# Patient Record
Sex: FEMALE | Race: WHITE | Employment: OTHER | URBAN - METROPOLITAN AREA
[De-identification: names, ages, dates, MRNs, and addresses within clinical notes are randomized per-mention and may not be internally consistent; named-entity substitution may affect disease eponyms.]

---

## 2017-04-05 PROBLEM — C43.59 MALIGNANT MELANOMA OF TORSO EXCLUDING BREAST (HCC): Status: ACTIVE | Noted: 2017-04-05

## 2017-04-27 ENCOUNTER — HOSPITAL ENCOUNTER (OUTPATIENT)
Dept: SURGERY | Age: 47
Discharge: HOME | End: 2017-04-27
Attending: OBSTETRICS & GYNECOLOGY
Payer: COMMERCIAL

## 2017-04-27 VITALS
DIASTOLIC BLOOD PRESSURE: 93 MMHG | TEMPERATURE: 95.5 F | RESPIRATION RATE: 16 BRPM | HEART RATE: 75 BPM | OXYGEN SATURATION: 100 % | BODY MASS INDEX: 30.52 KG/M2 | HEIGHT: 71 IN | WEIGHT: 218 LBS | SYSTOLIC BLOOD PRESSURE: 142 MMHG

## 2017-04-27 PROBLEM — Z01.818 PREOP EXAMINATION: Status: ACTIVE | Noted: 2017-04-27

## 2017-04-27 LAB
CREAT SERPL-MCNC: 0.82 MG/DL (ref 0.6–1)
HGB BLD-MCNC: 13 G/DL (ref 11.7–15.4)
POTASSIUM SERPL-SCNC: 3.5 MMOL/L (ref 3.5–5.1)

## 2017-04-27 PROCEDURE — 85018 HEMOGLOBIN: CPT | Performed by: ANESTHESIOLOGY

## 2017-04-27 PROCEDURE — 82565 ASSAY OF CREATININE: CPT | Performed by: ANESTHESIOLOGY

## 2017-04-27 PROCEDURE — 84132 ASSAY OF SERUM POTASSIUM: CPT | Performed by: ANESTHESIOLOGY

## 2017-04-27 NOTE — PERIOP NOTES
Patient verified name, , and surgery as listed in [facility]. Type 2 surgery, walk in assessment complete. Labs per surgeon: none;   Labs per anesthesia protocol: hemoglobin:  13.0; potassium:  3.5, creatinine:  0.82; results within anesthesia guidelines; printed/placed on chart; routed to Dr. Ezequiel Cantrell, PCP and to surgeon, Dr. Lisa Cramer for further review. EKG: none needed per protocol    Hibiclens and instructions given per hospital policy. Patient provided with handouts including Guide to Surgery, Pain Management, Hand Hygiene, Blood Transfusion Education, and [facility] Brochure. Patient answered medical/surgical history questions at their best of ability. All prior to admission medications documented in [facility]. Original medication prescription bottle NOT visualized during patient appointment. Patient instructed to hold all vitamins 7 days prior to surgery and NSAIDS 5 days prior to surgery, patient verbalized understanding. Medications to be held Mobic hold for 5 days prior to surgery    Patient instructed to continue previous medications as prescribed prior to surgery and to take the following medications the day of surgery according to anesthesia guidelines with a small sip of water: Flexeril, Lexapro, Ativan and Norco, if needed. Patient taught back and verbalized understanding.

## 2017-04-27 NOTE — PERIOP NOTES
Recent Results (from the past 8 hour(s))   HEMOGLOBIN    Collection Time: 04/27/17  2:25 PM   Result Value Ref Range    HGB 13.0 11.7 - 15.4 g/dL   POTASSIUM    Collection Time: 04/27/17  2:25 PM   Result Value Ref Range    Potassium 3.5 3.5 - 5.1 mmol/L   CREATININE    Collection Time: 04/27/17  2:25 PM   Result Value Ref Range    Creatinine 0.82 0.6 - 1.0 MG/DL

## 2017-04-27 NOTE — H&P
History and Physical      Darcella End:   Physician:  Queta Estrada. Alt, DO    This 50 y.o. female presents today for pre-operative evaluation. History: This 50 y.o.   patient has history of  menorrhagia. She had us that showed 6 week uterus with 2 fibroids. She is in the process of having several moles removed / diagnosis of melanoma. She does suffer from chronic back pain and has been taking norco.  She also has been having panic attacks for which she takes ativan. OB History     No data available          Past Medical History:   Diagnosis Date    Arthritis     Cervicalgia     Chronic pain     Claustrophobia     Depression     Hypertension     Lumbago     Migraine headache         has a past surgical history that includes orthopaedic ();  section; back surgery (2005); and carpal tunnel release (Left).  x 2   Current Outpatient Prescriptions   Medication Sig Dispense    escitalopram oxalate (LEXAPRO) 20 mg tablet Take 1 Tab by mouth daily. 90 Tab    lisinopril-hydroCHLOROthiazide (PRINZIDE, ZESTORETIC) 10-12.5 mg per tablet Take 1 Tab by mouth two (2) times a day. Indications: hypertension 180 Tab    LORazepam (ATIVAN) 1 mg tablet Take 1-2 Tabs by mouth every six (6) hours as needed for Anxiety. Max Daily Amount: 6 mg. Indications: ANXIETY 150 Tab    B INFANTIS/B ANI/B CARLA/B BIFID (PROBIOTIC 4X PO) Take  by mouth.  temazepam (RESTORIL) 15 mg capsule TAKE ONE CAPSULE BY MOUTH AT BEDTIME 30 Cap    meloxicam (MOBIC) 7.5 mg tablet Take  by mouth two (2) times a day.  amitriptyline (ELAVIL) 25 mg tablet Take  by mouth nightly.  promethazine (PHENERGAN) 25 mg suppository Insert 1 Suppository into rectum every six (6) hours as needed for Nausea. 6 Suppository    VITAMIN B COMP AND VIT C NO.6 (VITAMIN B COMP WITH VIT C NO.6 PO) Take 1 Tab by mouth daily.      HYDROcodone-acetaminophen (NORCO)  mg tablet Take 1 Tab by mouth every eight (8) hours as needed. Indications: PAIN     cyclobenzaprine (FLEXERIL) 10 mg tablet Take 10 mg by mouth three (3) times daily as needed for Muscle Spasm(s).  ascorbic acid (VITAMIN C) 1,000 mg tablet Take 1,000 mg by mouth daily.  cyanocobalamin (VITAMIN B-12) 2,500 mcg sublingual tablet Take 2,500 mcg by mouth daily.  hydrOXYzine HCl (ATARAX) 25 mg tablet Take 1 Tab by mouth three (3) times daily as needed for Itching. 270 Tab     No current facility-administered medications for this visit. Allergies   Allergen Reactions    Prednisone Nausea and Vomiting    Adhesive Rash    Amoxil [Amoxicillin] Unknown (comments)    Bactrim [Sulfamethoxazole-Trimethoprim] Hives    Penicillins Unknown (comments)   . reports that she quit smoking about 4 years ago. She has never used smokeless tobacco. She reports that she drinks alcohol. She reports that she does not use illicit drugs. family history includes Alcohol abuse in her brother; Breast Cancer (age of onset: 54) in her maternal aunt; Depression in her father; Heart Attack in her brother; Hypertension in her father; No Known Problems in her mother; Osteoporosis in an other family member; Stroke in her father. Physical Exam:    Vitals:    04/27/17 1103   BP: 120/84   Weight: 219 lb (99.3 kg)       Patient without distress. Heart: Regular rate and rhythm   Lung: clear to auscultation throughout lung fields, no wheezes, no rales, no rhonchi and normal respiratory effort  Abdomen: soft, nontender  Lower Extremities:  - Edema No    Findings/Diagnosis:   Pre-Op Evaluation done today. Menorrhagia / enlarged uterus. Surgery to be Performed:  [facility] with BS  Surgery Date: Wednesday, May 3, 2017  Surgery Place:  [facility]  Surgery Duration:  1.5 hour  Surgery Type:  Assistant Surgeon:  Dr. Elicia Costello    The risks of surgery were discussed in detail, including anesthesia, hemorrhage, blood clots, infection, damage to other organs such as bowel, bladder, ureters. Also the possible need for catheter use at home after surgery. Time away from work and physical restrictions discussed. Also the possible need for catheter use at home after surgery. Pt understands that complications aren't anticipated but that if they should occur than corrective procedures would be performed as indicated including, but not limited to, need for: transfusion, antibiotics, and additional surgical procedures including modification/extension of incision (possible vertical incision), colostomy, reimplantation of ureters, need for prolonged catheter drainage should urologic injury occur & other procedures as indicated. Unanticipated reactions to anesthesia as well as the small possibility of death were all discussed. All of the patient's questions/concerns were answered. She was encouraged to call me if she has additional questions/concerns prior to surgery. Pt understands & states she wants to proceed w/surgery.

## 2017-05-02 ENCOUNTER — ANESTHESIA EVENT (OUTPATIENT)
Dept: SURGERY | Age: 47
End: 2017-05-02
Payer: COMMERCIAL

## 2017-05-03 ENCOUNTER — ANESTHESIA (OUTPATIENT)
Dept: SURGERY | Age: 47
End: 2017-05-03
Payer: COMMERCIAL

## 2017-05-03 ENCOUNTER — HOSPITAL ENCOUNTER (OUTPATIENT)
Age: 47
Setting detail: OBSERVATION
Discharge: HOME | End: 2017-05-04
Attending: OBSTETRICS & GYNECOLOGY | Admitting: OBSTETRICS & GYNECOLOGY
Payer: COMMERCIAL

## 2017-05-03 DIAGNOSIS — Z79.899 ENCOUNTER FOR LONG-TERM (CURRENT) DRUG USE: ICD-10-CM

## 2017-05-03 DIAGNOSIS — Z01.818 PREOP EXAMINATION: ICD-10-CM

## 2017-05-03 DIAGNOSIS — M19.90 ARTHRITIS: ICD-10-CM

## 2017-05-03 DIAGNOSIS — N92.0 MENORRHAGIA WITH REGULAR CYCLE: Primary | ICD-10-CM

## 2017-05-03 DIAGNOSIS — F41.0 PANIC DISORDER: ICD-10-CM

## 2017-05-03 DIAGNOSIS — C43.59 MALIGNANT MELANOMA OF TORSO EXCLUDING BREAST (HCC): ICD-10-CM

## 2017-05-03 DIAGNOSIS — R11.0 NAUSEA: ICD-10-CM

## 2017-05-03 DIAGNOSIS — M54.12 CERVICAL RADICULOPATHY: ICD-10-CM

## 2017-05-03 LAB
ABO + RH BLD: NORMAL
BLOOD GROUP ANTIBODIES SERPL: NORMAL
HCG UR QL: NEGATIVE
SPECIMEN EXP DATE BLD: NORMAL

## 2017-05-03 PROCEDURE — 77030032490 HC SLV COMPR SCD KNE COVD -B: Performed by: OBSTETRICS & GYNECOLOGY

## 2017-05-03 PROCEDURE — 77010033678 HC OXYGEN DAILY

## 2017-05-03 PROCEDURE — 99218 HC RM OBSERVATION: CPT

## 2017-05-03 PROCEDURE — 77030035048 HC TRCR ENDOSC OPTCL COVD -B: Performed by: OBSTETRICS & GYNECOLOGY

## 2017-05-03 PROCEDURE — 76210000016 HC OR PH I REC 1 TO 1.5 HR: Performed by: OBSTETRICS & GYNECOLOGY

## 2017-05-03 PROCEDURE — 77030008703 HC TU ET UNCUF COVD -A: Performed by: ANESTHESIOLOGY

## 2017-05-03 PROCEDURE — 88307 TISSUE EXAM BY PATHOLOGIST: CPT | Performed by: OBSTETRICS & GYNECOLOGY

## 2017-05-03 PROCEDURE — 77030020782 HC GWN BAIR PAWS FLX 3M -B: Performed by: ANESTHESIOLOGY

## 2017-05-03 PROCEDURE — 77030018836 HC SOL IRR NACL ICUM -A: Performed by: OBSTETRICS & GYNECOLOGY

## 2017-05-03 PROCEDURE — 77030000038 HC TIP SCIS LAPSCP SURI -B: Performed by: OBSTETRICS & GYNECOLOGY

## 2017-05-03 PROCEDURE — 77030008477 HC STYL SATN SLP COVD -A: Performed by: ANESTHESIOLOGY

## 2017-05-03 PROCEDURE — 77030035029 HC NDL INSUF VERES DISP COVD -B: Performed by: OBSTETRICS & GYNECOLOGY

## 2017-05-03 PROCEDURE — 76060000035 HC ANESTHESIA 2 TO 2.5 HR: Performed by: OBSTETRICS & GYNECOLOGY

## 2017-05-03 PROCEDURE — 77030008518 HC TBNG INSUF ENDO STRY -B: Performed by: OBSTETRICS & GYNECOLOGY

## 2017-05-03 PROCEDURE — 77030008756 HC TU IRR SUC STRY -B: Performed by: OBSTETRICS & GYNECOLOGY

## 2017-05-03 PROCEDURE — 77030034849: Performed by: OBSTETRICS & GYNECOLOGY

## 2017-05-03 PROCEDURE — 77030035044 HC TRCR ENDOSC VRSPRT BLDLSS COVD -C: Performed by: OBSTETRICS & GYNECOLOGY

## 2017-05-03 PROCEDURE — 86900 BLOOD TYPING SEROLOGIC ABO: CPT | Performed by: OBSTETRICS & GYNECOLOGY

## 2017-05-03 PROCEDURE — 74011250636 HC RX REV CODE- 250/636: Performed by: OBSTETRICS & GYNECOLOGY

## 2017-05-03 PROCEDURE — 77030031139 HC SUT VCRL2 J&J -A: Performed by: OBSTETRICS & GYNECOLOGY

## 2017-05-03 PROCEDURE — 74011250636 HC RX REV CODE- 250/636: Performed by: ANESTHESIOLOGY

## 2017-05-03 PROCEDURE — 77030034154 HC SHR COAG HARM ACE J&J -F: Performed by: OBSTETRICS & GYNECOLOGY

## 2017-05-03 PROCEDURE — 94760 N-INVAS EAR/PLS OXIMETRY 1: CPT

## 2017-05-03 PROCEDURE — 77030011640 HC PAD GRND REM COVD -A: Performed by: OBSTETRICS & GYNECOLOGY

## 2017-05-03 PROCEDURE — 77030011943

## 2017-05-03 PROCEDURE — 74011250636 HC RX REV CODE- 250/636

## 2017-05-03 PROCEDURE — 76010000171 HC OR TIME 2 TO 2.5 HR INTENSV-TIER 1: Performed by: OBSTETRICS & GYNECOLOGY

## 2017-05-03 PROCEDURE — 77030018778 HC MANIP UTER VCAR CNMD -B: Performed by: OBSTETRICS & GYNECOLOGY

## 2017-05-03 PROCEDURE — 74011000250 HC RX REV CODE- 250

## 2017-05-03 PROCEDURE — 81025 URINE PREGNANCY TEST: CPT

## 2017-05-03 PROCEDURE — 74011250637 HC RX REV CODE- 250/637: Performed by: OBSTETRICS & GYNECOLOGY

## 2017-05-03 PROCEDURE — 77030035051: Performed by: OBSTETRICS & GYNECOLOGY

## 2017-05-03 RX ORDER — KETOROLAC TROMETHAMINE 30 MG/ML
INJECTION, SOLUTION INTRAMUSCULAR; INTRAVENOUS AS NEEDED
Status: DISCONTINUED | OUTPATIENT
Start: 2017-05-03 | End: 2017-05-03 | Stop reason: HOSPADM

## 2017-05-03 RX ORDER — ACETAMINOPHEN 10 MG/ML
1000 INJECTION, SOLUTION INTRAVENOUS EVERY 6 HOURS
Status: DISCONTINUED | OUTPATIENT
Start: 2017-05-03 | End: 2017-05-03

## 2017-05-03 RX ORDER — ONDANSETRON 2 MG/ML
4 INJECTION INTRAMUSCULAR; INTRAVENOUS
Status: DISCONTINUED | OUTPATIENT
Start: 2017-05-03 | End: 2017-05-04 | Stop reason: HOSPADM

## 2017-05-03 RX ORDER — KETOROLAC TROMETHAMINE 30 MG/ML
30 INJECTION, SOLUTION INTRAMUSCULAR; INTRAVENOUS
Status: DISPENSED | OUTPATIENT
Start: 2017-05-03 | End: 2017-05-04

## 2017-05-03 RX ORDER — ALBUTEROL SULFATE 0.83 MG/ML
2.5 SOLUTION RESPIRATORY (INHALATION) AS NEEDED
Status: DISCONTINUED | OUTPATIENT
Start: 2017-05-03 | End: 2017-05-03 | Stop reason: HOSPADM

## 2017-05-03 RX ORDER — CEFAZOLIN SODIUM IN 0.9 % NACL 2 G/50 ML
2 INTRAVENOUS SOLUTION, PIGGYBACK (ML) INTRAVENOUS ONCE
Status: COMPLETED | OUTPATIENT
Start: 2017-05-03 | End: 2017-05-03

## 2017-05-03 RX ORDER — DEXAMETHASONE SODIUM PHOSPHATE 4 MG/ML
INJECTION, SOLUTION INTRA-ARTICULAR; INTRALESIONAL; INTRAMUSCULAR; INTRAVENOUS; SOFT TISSUE AS NEEDED
Status: DISCONTINUED | OUTPATIENT
Start: 2017-05-03 | End: 2017-05-03 | Stop reason: HOSPADM

## 2017-05-03 RX ORDER — LISINOPRIL AND HYDROCHLOROTHIAZIDE 10; 12.5 MG/1; MG/1
1 TABLET ORAL 2 TIMES DAILY
Status: DISCONTINUED | OUTPATIENT
Start: 2017-05-03 | End: 2017-05-04 | Stop reason: HOSPADM

## 2017-05-03 RX ORDER — LIDOCAINE HYDROCHLORIDE 20 MG/ML
INJECTION, SOLUTION EPIDURAL; INFILTRATION; INTRACAUDAL; PERINEURAL AS NEEDED
Status: DISCONTINUED | OUTPATIENT
Start: 2017-05-03 | End: 2017-05-03 | Stop reason: HOSPADM

## 2017-05-03 RX ORDER — OXYCODONE HYDROCHLORIDE 5 MG/1
10 TABLET ORAL
Status: DISCONTINUED | OUTPATIENT
Start: 2017-05-03 | End: 2017-05-03 | Stop reason: HOSPADM

## 2017-05-03 RX ORDER — MIDAZOLAM HYDROCHLORIDE 1 MG/ML
2 INJECTION, SOLUTION INTRAMUSCULAR; INTRAVENOUS ONCE
Status: COMPLETED | OUTPATIENT
Start: 2017-05-03 | End: 2017-05-03

## 2017-05-03 RX ORDER — OXYCODONE HYDROCHLORIDE 5 MG/1
5 TABLET ORAL
Status: DISCONTINUED | OUTPATIENT
Start: 2017-05-03 | End: 2017-05-03 | Stop reason: HOSPADM

## 2017-05-03 RX ORDER — PROPOFOL 10 MG/ML
INJECTION, EMULSION INTRAVENOUS AS NEEDED
Status: DISCONTINUED | OUTPATIENT
Start: 2017-05-03 | End: 2017-05-03 | Stop reason: HOSPADM

## 2017-05-03 RX ORDER — LORAZEPAM 1 MG/1
1-2 TABLET ORAL
Status: DISCONTINUED | OUTPATIENT
Start: 2017-05-03 | End: 2017-05-04 | Stop reason: HOSPADM

## 2017-05-03 RX ORDER — FENTANYL CITRATE 50 UG/ML
100 INJECTION, SOLUTION INTRAMUSCULAR; INTRAVENOUS ONCE
Status: DISCONTINUED | OUTPATIENT
Start: 2017-05-03 | End: 2017-05-03 | Stop reason: HOSPADM

## 2017-05-03 RX ORDER — GLYCOPYRROLATE 0.2 MG/ML
INJECTION INTRAMUSCULAR; INTRAVENOUS AS NEEDED
Status: DISCONTINUED | OUTPATIENT
Start: 2017-05-03 | End: 2017-05-03 | Stop reason: HOSPADM

## 2017-05-03 RX ORDER — AMITRIPTYLINE HYDROCHLORIDE 50 MG/1
25 TABLET, FILM COATED ORAL
Status: DISCONTINUED | OUTPATIENT
Start: 2017-05-03 | End: 2017-05-04 | Stop reason: HOSPADM

## 2017-05-03 RX ORDER — CYCLOBENZAPRINE HCL 10 MG
10 TABLET ORAL
Status: DISCONTINUED | OUTPATIENT
Start: 2017-05-03 | End: 2017-05-04 | Stop reason: HOSPADM

## 2017-05-03 RX ORDER — SODIUM CHLORIDE 0.9 % (FLUSH) 0.9 %
5-10 SYRINGE (ML) INJECTION EVERY 8 HOURS
Status: DISCONTINUED | OUTPATIENT
Start: 2017-05-03 | End: 2017-05-03 | Stop reason: HOSPADM

## 2017-05-03 RX ORDER — MEPERIDINE HYDROCHLORIDE 50 MG/1
50 TABLET ORAL
Status: DISCONTINUED | OUTPATIENT
Start: 2017-05-03 | End: 2017-05-04 | Stop reason: HOSPADM

## 2017-05-03 RX ORDER — SODIUM CHLORIDE 0.9 % (FLUSH) 0.9 %
5-10 SYRINGE (ML) INJECTION AS NEEDED
Status: DISCONTINUED | OUTPATIENT
Start: 2017-05-03 | End: 2017-05-04 | Stop reason: HOSPADM

## 2017-05-03 RX ORDER — SODIUM CHLORIDE, SODIUM LACTATE, POTASSIUM CHLORIDE, CALCIUM CHLORIDE 600; 310; 30; 20 MG/100ML; MG/100ML; MG/100ML; MG/100ML
100 INJECTION, SOLUTION INTRAVENOUS CONTINUOUS
Status: DISCONTINUED | OUTPATIENT
Start: 2017-05-03 | End: 2017-05-03 | Stop reason: HOSPADM

## 2017-05-03 RX ORDER — HYDROMORPHONE HYDROCHLORIDE 1 MG/ML
1 INJECTION, SOLUTION INTRAMUSCULAR; INTRAVENOUS; SUBCUTANEOUS ONCE
Status: COMPLETED | OUTPATIENT
Start: 2017-05-03 | End: 2017-05-03

## 2017-05-03 RX ORDER — ONDANSETRON 2 MG/ML
4 INJECTION INTRAMUSCULAR; INTRAVENOUS ONCE
Status: DISCONTINUED | OUTPATIENT
Start: 2017-05-03 | End: 2017-05-03 | Stop reason: HOSPADM

## 2017-05-03 RX ORDER — ESCITALOPRAM OXALATE 10 MG/1
20 TABLET ORAL DAILY
Status: DISCONTINUED | OUTPATIENT
Start: 2017-05-04 | End: 2017-05-04 | Stop reason: HOSPADM

## 2017-05-03 RX ORDER — ONDANSETRON 2 MG/ML
INJECTION INTRAMUSCULAR; INTRAVENOUS AS NEEDED
Status: DISCONTINUED | OUTPATIENT
Start: 2017-05-03 | End: 2017-05-03 | Stop reason: HOSPADM

## 2017-05-03 RX ORDER — NEOSTIGMINE METHYLSULFATE 1 MG/ML
INJECTION INTRAVENOUS AS NEEDED
Status: DISCONTINUED | OUTPATIENT
Start: 2017-05-03 | End: 2017-05-03 | Stop reason: HOSPADM

## 2017-05-03 RX ORDER — LIDOCAINE HYDROCHLORIDE 10 MG/ML
0.1 INJECTION INFILTRATION; PERINEURAL AS NEEDED
Status: DISCONTINUED | OUTPATIENT
Start: 2017-05-03 | End: 2017-05-03 | Stop reason: HOSPADM

## 2017-05-03 RX ORDER — DIPHENHYDRAMINE HYDROCHLORIDE 50 MG/ML
12.5 INJECTION, SOLUTION INTRAMUSCULAR; INTRAVENOUS
Status: DISCONTINUED | OUTPATIENT
Start: 2017-05-03 | End: 2017-05-03 | Stop reason: HOSPADM

## 2017-05-03 RX ORDER — SODIUM CHLORIDE 0.9 % (FLUSH) 0.9 %
5-10 SYRINGE (ML) INJECTION AS NEEDED
Status: DISCONTINUED | OUTPATIENT
Start: 2017-05-03 | End: 2017-05-03 | Stop reason: HOSPADM

## 2017-05-03 RX ORDER — ACETAMINOPHEN 10 MG/ML
INJECTION, SOLUTION INTRAVENOUS AS NEEDED
Status: DISCONTINUED | OUTPATIENT
Start: 2017-05-03 | End: 2017-05-03 | Stop reason: HOSPADM

## 2017-05-03 RX ORDER — TEMAZEPAM 15 MG/1
15 CAPSULE ORAL
Status: DISCONTINUED | OUTPATIENT
Start: 2017-05-03 | End: 2017-05-04 | Stop reason: HOSPADM

## 2017-05-03 RX ORDER — NALOXONE HYDROCHLORIDE 0.4 MG/ML
0.1 INJECTION, SOLUTION INTRAMUSCULAR; INTRAVENOUS; SUBCUTANEOUS AS NEEDED
Status: DISCONTINUED | OUTPATIENT
Start: 2017-05-03 | End: 2017-05-03 | Stop reason: HOSPADM

## 2017-05-03 RX ORDER — MIDAZOLAM HYDROCHLORIDE 1 MG/ML
2 INJECTION, SOLUTION INTRAMUSCULAR; INTRAVENOUS
Status: DISCONTINUED | OUTPATIENT
Start: 2017-05-03 | End: 2017-05-03 | Stop reason: HOSPADM

## 2017-05-03 RX ORDER — HYDROMORPHONE HYDROCHLORIDE 2 MG/ML
0.5 INJECTION, SOLUTION INTRAMUSCULAR; INTRAVENOUS; SUBCUTANEOUS
Status: DISCONTINUED | OUTPATIENT
Start: 2017-05-03 | End: 2017-05-03 | Stop reason: HOSPADM

## 2017-05-03 RX ORDER — SODIUM CHLORIDE 0.9 % (FLUSH) 0.9 %
5-10 SYRINGE (ML) INJECTION EVERY 8 HOURS
Status: DISCONTINUED | OUTPATIENT
Start: 2017-05-03 | End: 2017-05-04 | Stop reason: HOSPADM

## 2017-05-03 RX ORDER — ROCURONIUM BROMIDE 10 MG/ML
INJECTION, SOLUTION INTRAVENOUS AS NEEDED
Status: DISCONTINUED | OUTPATIENT
Start: 2017-05-03 | End: 2017-05-03 | Stop reason: HOSPADM

## 2017-05-03 RX ORDER — FENTANYL CITRATE 50 UG/ML
INJECTION, SOLUTION INTRAMUSCULAR; INTRAVENOUS AS NEEDED
Status: DISCONTINUED | OUTPATIENT
Start: 2017-05-03 | End: 2017-05-03 | Stop reason: HOSPADM

## 2017-05-03 RX ADMIN — DEXAMETHASONE SODIUM PHOSPHATE 10 MG: 4 INJECTION, SOLUTION INTRA-ARTICULAR; INTRALESIONAL; INTRAMUSCULAR; INTRAVENOUS; SOFT TISSUE at 08:10

## 2017-05-03 RX ADMIN — FENTANYL CITRATE 25 MCG: 50 INJECTION, SOLUTION INTRAMUSCULAR; INTRAVENOUS at 09:42

## 2017-05-03 RX ADMIN — FENTANYL CITRATE 25 MCG: 50 INJECTION, SOLUTION INTRAMUSCULAR; INTRAVENOUS at 09:55

## 2017-05-03 RX ADMIN — MEPERIDINE HYDROCHLORIDE 50 MG: 50 TABLET ORAL at 15:05

## 2017-05-03 RX ADMIN — LIDOCAINE HYDROCHLORIDE 40 MG: 20 INJECTION, SOLUTION EPIDURAL; INFILTRATION; INTRACAUDAL; PERINEURAL at 08:00

## 2017-05-03 RX ADMIN — ACETAMINOPHEN 1000 MG: 10 INJECTION, SOLUTION INTRAVENOUS at 09:45

## 2017-05-03 RX ADMIN — SODIUM CHLORIDE, SODIUM LACTATE, POTASSIUM CHLORIDE, AND CALCIUM CHLORIDE 100 ML/HR: 600; 310; 30; 20 INJECTION, SOLUTION INTRAVENOUS at 07:15

## 2017-05-03 RX ADMIN — HYDROMORPHONE HYDROCHLORIDE 0.5 MG: 2 INJECTION, SOLUTION INTRAMUSCULAR; INTRAVENOUS; SUBCUTANEOUS at 10:30

## 2017-05-03 RX ADMIN — MIDAZOLAM HYDROCHLORIDE 2 MG: 1 INJECTION, SOLUTION INTRAMUSCULAR; INTRAVENOUS at 07:13

## 2017-05-03 RX ADMIN — CYCLOBENZAPRINE HYDROCHLORIDE 10 MG: 10 TABLET, FILM COATED ORAL at 15:05

## 2017-05-03 RX ADMIN — GLYCOPYRROLATE 0.4 MG: 0.2 INJECTION INTRAMUSCULAR; INTRAVENOUS at 09:43

## 2017-05-03 RX ADMIN — FENTANYL CITRATE 100 MCG: 50 INJECTION, SOLUTION INTRAMUSCULAR; INTRAVENOUS at 08:00

## 2017-05-03 RX ADMIN — HYDROMORPHONE HYDROCHLORIDE 0.5 MG: 2 INJECTION, SOLUTION INTRAMUSCULAR; INTRAVENOUS; SUBCUTANEOUS at 10:40

## 2017-05-03 RX ADMIN — CEFAZOLIN 2 G: 1 INJECTION, POWDER, FOR SOLUTION INTRAMUSCULAR; INTRAVENOUS; PARENTERAL at 08:10

## 2017-05-03 RX ADMIN — MEPERIDINE HYDROCHLORIDE 50 MG: 50 TABLET ORAL at 21:30

## 2017-05-03 RX ADMIN — HYDROMORPHONE HYDROCHLORIDE 1 MG: 1 INJECTION, SOLUTION INTRAMUSCULAR; INTRAVENOUS; SUBCUTANEOUS at 12:42

## 2017-05-03 RX ADMIN — HYDROMORPHONE HYDROCHLORIDE 0.5 MG: 2 INJECTION, SOLUTION INTRAMUSCULAR; INTRAVENOUS; SUBCUTANEOUS at 10:22

## 2017-05-03 RX ADMIN — AMITRIPTYLINE HYDROCHLORIDE 25 MG: 50 TABLET, FILM COATED ORAL at 21:29

## 2017-05-03 RX ADMIN — LISINOPRIL AND HYDROCHLOROTHIAZIDE 1 TABLET: 12.5; 1 TABLET ORAL at 12:42

## 2017-05-03 RX ADMIN — ONDANSETRON 4 MG: 2 INJECTION INTRAMUSCULAR; INTRAVENOUS at 09:16

## 2017-05-03 RX ADMIN — PROPOFOL 200 MG: 10 INJECTION, EMULSION INTRAVENOUS at 08:00

## 2017-05-03 RX ADMIN — Medication 10 ML: at 21:29

## 2017-05-03 RX ADMIN — NEOSTIGMINE METHYLSULFATE 3 MG: 1 INJECTION INTRAVENOUS at 09:43

## 2017-05-03 RX ADMIN — ROCURONIUM BROMIDE 5 MG: 10 INJECTION, SOLUTION INTRAVENOUS at 08:48

## 2017-05-03 RX ADMIN — HYDROMORPHONE HYDROCHLORIDE 0.5 MG: 2 INJECTION, SOLUTION INTRAMUSCULAR; INTRAVENOUS; SUBCUTANEOUS at 10:15

## 2017-05-03 RX ADMIN — KETOROLAC TROMETHAMINE 30 MG: 30 INJECTION, SOLUTION INTRAMUSCULAR; INTRAVENOUS at 09:44

## 2017-05-03 RX ADMIN — SODIUM CHLORIDE, SODIUM LACTATE, POTASSIUM CHLORIDE, AND CALCIUM CHLORIDE: 600; 310; 30; 20 INJECTION, SOLUTION INTRAVENOUS at 08:15

## 2017-05-03 RX ADMIN — KETOROLAC TROMETHAMINE 30 MG: 30 INJECTION, SOLUTION INTRAMUSCULAR at 13:29

## 2017-05-03 RX ADMIN — ROCURONIUM BROMIDE 50 MG: 10 INJECTION, SOLUTION INTRAVENOUS at 08:00

## 2017-05-03 RX ADMIN — MEPERIDINE HYDROCHLORIDE 50 MG: 50 TABLET ORAL at 18:07

## 2017-05-03 RX ADMIN — ROCURONIUM BROMIDE 10 MG: 10 INJECTION, SOLUTION INTRAVENOUS at 09:04

## 2017-05-03 NOTE — PROGRESS NOTES
TRANSFER - IN REPORT:    Verbal report received from Vivien Guerrero (name) on Iván Yousif  being received from PACU (unit) for routine progression of care      Report consisted of patients Situation, Background, Assessment and   Recommendations(SBAR). Information from the following report(s) SBAR, Kardex and OR Summary was reviewed with the receiving nurse. Opportunity for questions and clarification was provided. Assessment completed upon patients arrival to unit and care assumed.

## 2017-05-03 NOTE — PROGRESS NOTES
Meperidine 50 mg po and flexeril 10 mg po for pain 6/10. (see flow sheet). Safety measures in place. Continent to monitor.

## 2017-05-03 NOTE — IP AVS SNAPSHOT
Rakesh White 
 
 
 [address] 
[phone] Patient: Dali Pappas MRN: [id number] QVX:8[birth year] You are allergic to the following Allergen Reactions Prednisone Nausea and Vomiting Adhesive Rash Amoxil (Amoxicillin) Unknown (comments) Bactrim (Sulfamethoxazole-Trimethoprim) Hives Penicillins Unknown (comments) Recent Documentation Height Weight Breastfeeding? BMI OB Status Smoking Status 1.803 m 96.6 kg No 29.71 kg/m2 Having regular periods Former Smoker Emergency Contacts Name Discharge Info Relation Home Work Mobile Eriberto Duff DISCHARGE CAREGIVER [3] Spouse [3] [phone] [place]  Brother [24] [phone] About your hospitalization You were admitted on:  May 3, 2017 You last received care in the:  [address] You were discharged on: May 4, 2017 Unit phone number:  [phone] Why you were hospitalized Your primary diagnosis was:  Menorrhagia Providers Seen During Your Hospitalizations Provider Role Specialty Primary office phone Toshia Yost DO Attending Provider Obstetrics & Gynecology [phone] Your Primary Care Physician (PCP) Primary Care Physician Office Phone Office Fax 251 [facility], [address] [place] [address] [phone] Follow-up Information Follow up With Details Comments Contact Info Jony Baird MD   [address] [facility] [address] 
[phone] Your Appointments Wednesday May 17, 2017  1:45 PM EDT  
POST OP with Vicente Fernandez DO  
[address] [place] [id number] [phone] Current Discharge Medication List  
  
START taking these medications Dose & Instructions Dispensing Information Comments Morning Noon Evening Bedtime meperidine 50 mg tablet Commonly known as:  DEMEROL Dose:  50 mg Take 1 Tab by mouth every four (4) hours as needed. Max Daily Amount: 300 mg. Quantity:  30 Tab Refills:  0 CONTINUE these medications which have CHANGED Dose & Instructions Dispensing Information Comments Morning Noon Evening Bedtime  
 escitalopram oxalate 20 mg tablet Commonly known as:  Urbano Mealing What changed:  additional instructions Your next dose is:  Tomorrow Dose:  20 mg Take 1 Tab by mouth daily. Quantity:  90 Tab Refills:  1 Stop prozac LORazepam 1 mg tablet Commonly known as:  ATIVAN What changed:  additional instructions Dose:  1-2 mg Take 1-2 Tabs by mouth every six (6) hours as needed for Anxiety. Max Daily Amount: 6 mg. Indications: ANXIETY Quantity:  150 Tab Refills:  1 CONTINUE these medications which have NOT CHANGED Dose & Instructions Dispensing Information Comments Morning Noon Evening Bedtime  
 amitriptyline 25 mg tablet Commonly known as:  ELAVIL Dose:  25 mg Take 25 mg by mouth nightly. For back discomfort Refills:  0  
     
   
   
   
  
 cyclobenzaprine 10 mg tablet Commonly known as:  FLEXERIL Dose:  10 mg Take 10 mg by mouth three (3) times daily as needed for Muscle Spasm(s). Take / use AM day of surgery  per anesthesia protocols. Indications: MUSCLE SPASM Refills:  0 HYDROcodone-acetaminophen  mg tablet Commonly known as:  Jenie Nereida Dose:  1 Tab Take 1 Tab by mouth every eight (8) hours as needed. Take / use AM day of surgery  per anesthesia protocols. Indications: Pain Refills:  0  
     
   
   
   
  
 lisinopril-hydroCHLOROthiazide 10-12.5 mg per tablet Commonly known as:  Mariela Melo Your next dose is: Today Dose:  1 Tab Take 1 Tab by mouth two (2) times a day. Indications: hypertension Quantity:  180 Tab Refills:  3  
     
   
   
  
   
  
 meloxicam 7.5 mg tablet Commonly known as:  MOBIC Your next dose is: Today Take  by mouth two (2) times a day. Refills:  0 PROBIOTIC 4X PO Your next dose is:  Tomorrow Take  by mouth daily. Refills:  0  
     
   
   
   
  
 promethazine 25 mg suppository Commonly known as:  PHENERGAN Dose:  25 mg Insert 1 Suppository into rectum every six (6) hours as needed for Nausea. Quantity:  6 Suppository Refills:  1  
     
   
   
   
  
 temazepam 15 mg capsule Commonly known as:  RESTORIL Your next dose is: Today TAKE ONE CAPSULE BY MOUTH AT BEDTIME Quantity:  30 Cap Refills:  5 This request is for a new prescription for a controlled substance as required by Federal/State law. VITAMIN B COMP WITH VIT C NO.6 PO Your next dose is:  Tomorrow Dose:  1 Tab Take 1 Tab by mouth daily. Refills:  0  
     
   
   
   
  
 VITAMIN B-12 2,500 mcg sublingual tablet Generic drug:  cyanocobalamin Your next dose is:  Tomorrow Dose:  2500 mcg Take 2,500 mcg by mouth daily. Refills:  0  
     
   
   
   
  
 VITAMIN C 1,000 mg tablet Generic drug:  ascorbic acid (vitamin C) Your next dose is:  Tomorrow Dose:  1000 mg Take 1,000 mg by mouth daily. Refills:  0 Where to Get Your Medications Information on where to get these meds will be given to you by the nurse or doctor. ! Ask your nurse or doctor about these medications  
  meperidine 50 mg tablet Discharge Instructions DISCHARGE SUMMARY from Nurse The following personal items are in your possession at time of discharge: 
 
Dental Appliances: None Visual Aid: None Home Medications: None Jewelry: None Clothing: Footwear, Pants, Undergarments, Shirt, Socks Other Valuables: Cell Phone, Personal toiletries PATIENT INSTRUCTIONS: 
 
After general anesthesia or intravenous sedation, for 24 hours or while taking prescription Narcotics: · Limit your activities · Do not drive and operate hazardous machinery · Do not make important personal or business decisions · Do  not drink alcoholic beverages · If you have not urinated within 8 hours after discharge, please contact your surgeon on call. Report the following to your surgeon: 
· Excessive pain, swelling, redness or odor of or around the surgical area · Temperature over 100.5 · Nausea and vomiting lasting longer than 4 hours or if unable to take medications · Any signs of decreased circulation or nerve impairment to extremity: change in color, persistent  numbness, tingling, coldness or increase pain · Any questions What to do at Home: 
Recommended activity: Activity as tolerated, per MD 
 
If you experience any of the following symptoms fever>101, pain unrelieved with medication, nausea/vomiting, shortness of breath, dizziness/fainting, chest pain. , please follow up with your doctor. *  Please give a list of your current medications to your Primary Care Provider. *  Please update this list whenever your medications are discontinued, doses are 
    changed, or new medications (including over-the-counter products) are added. *  Please carry medication information at all times in case of emergency situations. These are general instructions for a healthy lifestyle: No smoking/ No tobacco products/ Avoid exposure to second hand smoke Surgeon General's Warning:  Quitting smoking now greatly reduces serious risk to your health. Obesity, smoking, and sedentary lifestyle greatly increases your risk for illness A healthy diet, regular physical exercise & weight monitoring are important for maintaining a healthy lifestyle You may be retaining fluid if you have a history of heart failure or if you experience any of the following symptoms:  Weight gain of 3 pounds or more overnight or 5 pounds in a week, increased swelling in our hands or feet or shortness of breath while lying flat in bed. Please call your doctor as soon as you notice any of these symptoms; do not wait until your next office visit. Recognize signs and symptoms of STROKE: 
 
F-face looks uneven A-arms unable to move or move unevenly S-speech slurred or non-existent T-time-call 911 as soon as signs and symptoms begin-DO NOT go Back to bed or wait to see if you get better-TIME IS BRAIN. Warning Signs of HEART ATTACK Call 911 if you have these symptoms: 
? Chest discomfort. Most heart attacks involve discomfort in the center of the chest that lasts more than a few minutes, or that goes away and comes back. It can feel like uncomfortable pressure, squeezing, fullness, or pain. ? Discomfort in other areas of the upper body. Symptoms can include pain or discomfort in one or both arms, the back, neck, jaw, or stomach. ? Shortness of breath with or without chest discomfort. ? Other signs may include breaking out in a cold sweat, nausea, or lightheadedness. Don't wait more than five minutes to call [address]! Fast action can save your life. Calling 911 is almost always the fastest way to get lifesaving treatment. Emergency Medical Services staff can begin treatment when they arrive  up to an hour sooner than if someone gets to the hospital by car. The discharge information has been reviewed with the patient. The patient verbalized understanding. Discharge medications reviewed with the patient and appropriate educational materials and side effects teaching were provided. Laparoscopic Hysterectomy: What to Expect at [facility] Your Recovery A hysterectomy is surgery to take out the uterus. In some cases, the ovaries and fallopian tubes also are taken out at the same time. You can expect to feel better and stronger each day, but you may need pain medicine for a week or two. You may get tired easily or have less energy than usual. The tiredness may last for several weeks after surgery. You will probably notice that your belly is swollen and puffy. This is common. The swelling will take several weeks to go down. You may take about 4 to 6 weeks to fully recover. It is important to avoid lifting while you are recovering so that you can heal. 
This care sheet gives you a general idea about how long it will take for you to recover. But each person recovers at a different pace. Follow the steps below to get better as quickly as possible. How can you care for yourself at home? Activity · Rest when you feel tired. · Be active. Walking is a good choice. · Allow the area to heal. Don't move quickly or lift anything heavy until you are feeling better. · You may shower 24 to 48 hours after surgery, if your doctor okays it. Pat the incision dry. Do not take a bath for the first 2 weeks, or until your doctor tells you it is okay. · Ask your doctor when it is okay for you to have sex. Diet · You can eat your normal diet. If your stomach is upset, try bland, low-fat foods like plain rice, broiled chicken, toast, and yogurt. · If your bowel movements are not regular right after surgery, try to avoid constipation and straining. Drink plenty of water. Your doctor may suggest fiber, a stool softener, or a mild laxative. Medicines · Your doctor will tell you if and when you can restart your medicines. He or she will also give you instructions about taking any new medicines. · If you take blood thinners, such as warfarin (Coumadin), clopidogrel (Plavix), or aspirin, be sure to talk to your doctor.  He or she will tell you if and when to start taking those medicines again. Make sure that you understand exactly what your doctor wants you to do. · Be safe with medicines. Read and follow all instructions on the label. ¨ If the doctor gave you a prescription medicine for pain, take it as prescribed. ¨ If you are not taking a prescription pain medicine, ask your doctor if you can take an over-the-counter medicine. · If your doctor prescribed antibiotics, take them as directed. Do not stop taking them just because you feel better. You need to take the full course of antibiotics. Incision care · You may have stitches over the cuts (incisions) the doctor made in your belly. · If you have strips of tape on the cut (incision) the doctor made, leave the tape on for a week or until it falls off. · Wash the area daily with warm, soapy water, and pat it dry. Don't use hydrogen peroxide or alcohol. They can slow healing. · You may cover the area with a gauze bandage if it oozes fluid or rubs against clothing. · Change the bandage every day. Other instructions · You may have some light vaginal bleeding. Wear sanitary pads if needed. Do not douche or use tampons. · Don't have sex until the doctor says it is okay. Follow-up care is a key part of your treatment and safety. Be sure to make and go to all appointments, and call your doctor if you are having problems. It's also a good idea to know your test results and keep a list of the medicines you take. When should you call for help? Call 911 anytime you think you may need emergency care. For example, call if: 
· You passed out (lost consciousness). · You have sudden chest pain and shortness of breath, or you cough up blood. Call your doctor now or seek immediate medical care if: 
· You have severe vaginal bleeding. This means that you are soaking through your usual pads every hour for 2 or more hours. · You have sudden, severe pain in your belly or pelvis. · You have loose stitches, or your incision comes open. · You have signs of infection, such as: 
¨ Increased pain, swelling, warmth, or redness. ¨ Red streaks leading from the incision. ¨ Pus draining from the incision. ¨ Swollen lymph nodes in your neck, armpits, or groin. ¨ A fever. Watch closely for changes in your health, and be sure to contact your doctor if: 
· You do not get better as expected. Where can you learn more? Go to http://delphine-dhara.info/. Enter Q131 in the search box to learn more about \"Laparoscopic Hysterectomy: What to Expect at Home. \" Current as of: October 13, 2016 Content Version: 11.2 © 5198-3031 [employer]. Care instructions adapted under license by [employer] (which disclaims liability or warranty for this information). If you have questions about a medical condition or this instruction, always ask your healthcare professional. [place] [address] any warranty or liability for your use of this information. Discharge Orders Procedure Order Date Status Priority Quantity Spec Type Associated Dx CALL YOUR DOCTOR For: Other Call for fever >100.4, vaginal bleeding > 1 pad per hour, s&s of wound infection 05/04/17 0951 Normal Routine 1  Menorrhagia with regular cycle [[id number]] Comments:  Call for fever >100.4, vaginal bleeding > 1 pad per hour, s&s of wound infection Questions: For:  Other ACTIVITY AFTER DISCHARGE Patient should: Restrict lifting Pelvic Rest 05/04/17 0951 Normal Routine 1  Menorrhagia with regular cycle [[id number]] Comments:  Pelvic Rest  
  Questions: Patient should:  Restrict lifting DIET REGULAR 05/04/17 0951 Normal Routine 1  Menorrhagia with regular cycle [[id number]] [employer] Announcement We are excited to announce that we are making your provider's discharge notes available to you in [employer].   You will see these notes when they are completed and signed by the physician that discharged you from your recent hospital stay. If you have any questions or concerns about any information you see in [employer], please call the Health Information Department where you were seen or reach out to your Primary Care Provider for more information about your plan of care. Introducing hospitals & HEALTH SERVICES! Hermanbrigitte Eisenberg introduces [employer] patient portal. Now you can access parts of your medical record, email your doctor's office, and request medication refills online. 1. In your internet browser, go to [email or web address] 2. Click on the First Time User? Click Here link in the Sign In box. You will see the New Member Sign Up page. 3. Enter your [employer] Access Code exactly as it appears below. You will not need to use this code after youve completed the sign-up process. If you do not sign up before the expiration date, you must request a new code. · [employer] Access Code: [email or web address] Expires: 8/2/2017  9:57 AM 
 
4. Enter the last four digits of your Social Security Number (xxxx) and Date of Birth (mm/dd/yyyy) as indicated and click Submit. You will be taken to the next sign-up page. 5. Create a [employer] ID. This will be your [employer] login ID and cannot be changed, so think of one that is secure and easy to remember. 6. Create a [employer] password. You can change your password at any time. 7. Enter your Password Reset Question and Answer. This can be used at a later time if you forget your password. 8. Enter your e-mail address. You will receive e-mail notification when new information is available in [address] 9. Click Sign Up. You can now view and download portions of your medical record. 10. Click the Download Summary menu link to download a portable copy of your medical information.  
 
If you have questions, please visit the Frequently Asked Questions section of the [employer]. Remember, MyChart is NOT to be used for urgent needs. For medical emergencies, dial 911. Now available from your iPhone and Android! General Information Please provide this summary of care documentation to your next provider. Patient Signature:  ____________________________________________________________ Date:  ____________________________________________________________  
  
[facility] Provider Signature:  ____________________________________________________________ Date:  ____________________________________________________________

## 2017-05-03 NOTE — ANESTHESIA POSTPROCEDURE EVALUATION
Post-Anesthesia Evaluation and Assessment    Patient: Brittany Duffy MRN: [id number]  SSN: [id number]    YOB: 1970  Age: 50 y.o. Sex: female       Cardiovascular Function/Vital Signs  Visit Vitals    BP 96/55    Pulse 76    Temp 36.7 °C (98 °F)    Resp 16    Ht 5' 11\" (1.803 m)    Wt 96.6 kg (213 lb)    SpO2 100%    BMI 29.71 kg/m2       Patient is status post general anesthesia for Procedure(s): HYSTERECTOMY TOTAL LAPAROSCOPIC BILATERAL SALPINGECTOMY. Nausea/Vomiting: None    Postoperative hydration reviewed and adequate. Pain:  Pain Scale 1: Numeric (0 - 10) (05/03/17 1040)  Pain Intensity 1: 5 (05/03/17 1040)   Managed    Neurological Status:   Neuro (WDL): Exceptions to WDL (05/03/17 4368)  Neuro  Neurologic State: Drowsy (05/03/17 3399)   At baseline    Mental Status and Level of Consciousness: Arousable    Pulmonary Status:   O2 Device: Nasal cannula (05/03/17 1043)   Adequate oxygenation and airway patent    Complications related to anesthesia: None    Post-anesthesia assessment completed.  No concerns    Signed By: Elif Cartagena MD     May 3, 2017

## 2017-05-03 NOTE — OP NOTES
Laparoscopic Hysterectomy Operative Report    Patient: Pablo Malloy MRN: [id number]  SSN: [id number]    YOB: 1970  Age: 50 y.o. Sex: female      Date of Surgery: 5/3/2017     Preoperative Diagnosis: Menorrhagia with regular cycle [N92.0]  Enlarged uterus [N85.2], fibroid    Postoperative Diagnosis: Menorrhagia with regular cycle [N92.0]  Enlarged uterus [N85.2]     Surgeon(s) and Role:     * Frieda Fernandez DO - Primary     * Margaret Ambrocio MD - Assisting     Anesthesia: General     Procedure:  Procedure(s): HYSTERECTOMY TOTAL LAPAROSCOPIC BILATERAL SALPINGECTOMY     Findings: uterus sounded to 9cm. Fibroid uterus. Grossly normal ovaries. Tubes with paratubal cysts on left. Normal liver edge and gallbladder. Scar tissue by bladder flap. Both ureteral jets seen during cystoscopy and no defects in bladder. Omentum was stuck to umbilical area and cephalad. Estimated Blood Loss:    150cc    Drains: hoffman     Fluids:  2500cc     Urine Output:  600 cc clear yellow         Specimens:    ID Type Source Tests Collected by Time Destination   1 : Uterus with Bilateral Fallopian Tubes Fresh Uterus with Bilateral Fallopian Tubes  Frieda Fernandez DO 5/3/2017 0845 Pathology         DVT Prophylaxis: scds     Antibiotic Prophylaxis: ancef     Procedure in Detail:  Patient was taken to the operating room where she was administered geta. Once her anesthesia was found to be adequate, and appropiate time out occurred, she was prepped and draped in normal sterile fashion with arms tucked. Next v-care uterine manipulator was placed around external cervical os and balloon was inflated. Next attention was turned to the umbilicus. A 5mm skin incision was made and Veress needle was inserted with clear aspiration of fluid. Next Co2 gas was connected with opening pressure noted to be 7 mmHg. Co2 gas was instilled until abdominal was insufflated. Patient was placed in steep trendelenburg. Next a 10mm right port site was placed under direct visualization. Next 5mm port was placed in left lower quadrant under direct visualization. Survey was taken of the abdomen and pelvis with findings as noted above. Next attention was turned to left fallopian tube which was elevated and the tube was removed using the harmonic scalpel. Next, the round ligament and  uteroovarian were taken down with the harmonic scalpel. The harmonic was used to take down the cardinals and the uterine artery pedicle with excellent hemostasis. Next bladder flap was dissected. Next the right adnexa was taken down in similar fashion to left. Extensive dissection of the bladder flap continued. Once the bladder was sure to be beyond V-care cup,  the harmonic scalpel was used to dissect the vaginal cuff at the apex of the v-care cup. Next the uterus was pulled into the vaginal vault and the cuff was sutured using 0-vicryl in figure of 8 fashion with excellent hemostasis. Next the pelvic was irrigated and excellent hemostasis was insured. Attention was then turned to the bladder with cystoscopy performed. After cystoscopy, attention was redirected to the abdomen. Irrigation was performed, pressure reduced with good hemostasis. All ports were removed under direct visualization. The skin site over the right lower quadrant site was sutured with 4-0 vicryl. Dermabond was placed over all sites. Pt tolerated the procedure well and was taken to the PACU in stable fashion.       Signed By: Carlo Jean-Baptiste DO     May 3, 2017

## 2017-05-03 NOTE — ANESTHESIA PREPROCEDURE EVALUATION
Anesthetic History               Review of Systems / Medical History  Patient summary reviewed, nursing notes reviewed and pertinent labs reviewed    Pulmonary                   Neuro/Psych              Cardiovascular    Hypertension: well controlled              Exercise tolerance: >4 METS     GI/Hepatic/Renal     GERD: well controlled           Endo/Other             Other Findings              Physical Exam    Airway  Mallampati: I  TM Distance: 4 - 6 cm  Neck ROM: normal range of motion   Mouth opening: Normal     Cardiovascular  Regular rate and rhythm,  S1 and S2 normal,  no murmur, click, rub, or gallop             Dental  No notable dental hx       Pulmonary  Breath sounds clear to auscultation               Abdominal         Other Findings            Anesthetic Plan    ASA: 2  Anesthesia type: general          Induction: Intravenous  Anesthetic plan and risks discussed with: Patient

## 2017-05-03 NOTE — PERIOP NOTES
TRANSFER - OUT REPORT:    Verbal report given to Marlyn Jason RN on [facility]  being transferred to 3rd floor [facility] surg for routine progression of care       Report consisted of patients Situation, Background, Assessment and   Recommendations(SBAR). Information from the following report(s) OR Summary, Procedure Summary, Intake/Output and MAR was reviewed with the receiving nurse. Lines:   Peripheral IV 05/03/17 Left Wrist (Active)   Site Assessment Clean, dry, & intact 5/3/2017 11:09 AM   Phlebitis Assessment 0 5/3/2017 11:09 AM   Infiltration Assessment 0 5/3/2017 11:09 AM   Dressing Status Clean, dry, & intact 5/3/2017 11:09 AM   Dressing Type Transparent 5/3/2017 11:09 AM   Hub Color/Line Status Infusing 5/3/2017 11:09 AM        Opportunity for questions and clarification was provided.       Patient transported with:   O2 @ 2 liters

## 2017-05-03 NOTE — PROGRESS NOTES
Admission assessment completed. Pt is alert and oriented x 4 drowsy but wakes easily and falls quickly back to sleep. 3 P sites c/d/i hoffman to bedside bag ordered to be removed at 1430 and dry jonel-pad. Sipping on pepsi. Family at bedside. Safety measures in place. Continue to monitor.

## 2017-05-03 NOTE — PROGRESS NOTES
05/03/17 1418   Oxygen Therapy   O2 Sat (%) 97 %   Pulse via Oximetry 97 beats per minute   O2 Device Nasal cannula   O2 Flow Rate (L/min) 2 l/min   FIO2 (%) 28 %   Pt instructed in the use of Incentive Spirometry.  No distress noted

## 2017-05-04 VITALS
HEIGHT: 71 IN | RESPIRATION RATE: 18 BRPM | WEIGHT: 213 LBS | TEMPERATURE: 96 F | OXYGEN SATURATION: 97 % | BODY MASS INDEX: 29.82 KG/M2 | DIASTOLIC BLOOD PRESSURE: 76 MMHG | HEART RATE: 100 BPM | SYSTOLIC BLOOD PRESSURE: 121 MMHG

## 2017-05-04 LAB — HGB BLD-MCNC: 11.2 G/DL (ref 11.7–15.4)

## 2017-05-04 PROCEDURE — 99218 HC RM OBSERVATION: CPT

## 2017-05-04 PROCEDURE — 74011250637 HC RX REV CODE- 250/637: Performed by: OBSTETRICS & GYNECOLOGY

## 2017-05-04 PROCEDURE — 36415 COLL VENOUS BLD VENIPUNCTURE: CPT | Performed by: OBSTETRICS & GYNECOLOGY

## 2017-05-04 PROCEDURE — 85018 HEMOGLOBIN: CPT | Performed by: OBSTETRICS & GYNECOLOGY

## 2017-05-04 PROCEDURE — 77030034849

## 2017-05-04 RX ORDER — MEPERIDINE HYDROCHLORIDE 50 MG/1
50 TABLET ORAL
Qty: 30 TAB | Refills: 0 | Status: SHIPPED | OUTPATIENT
Start: 2017-05-04 | End: 2017-06-02

## 2017-05-04 RX ADMIN — MEPERIDINE HYDROCHLORIDE 50 MG: 50 TABLET ORAL at 01:00

## 2017-05-04 RX ADMIN — MEPERIDINE HYDROCHLORIDE 50 MG: 50 TABLET ORAL at 07:36

## 2017-05-04 RX ADMIN — MEPERIDINE HYDROCHLORIDE 50 MG: 50 TABLET ORAL at 13:33

## 2017-05-04 RX ADMIN — Medication 10 ML: at 06:05

## 2017-05-04 RX ADMIN — ESCITALOPRAM OXALATE 20 MG: 10 TABLET ORAL at 07:36

## 2017-05-04 RX ADMIN — MEPERIDINE HYDROCHLORIDE 50 MG: 50 TABLET ORAL at 10:21

## 2017-05-04 RX ADMIN — MEPERIDINE HYDROCHLORIDE 50 MG: 50 TABLET ORAL at 04:07

## 2017-05-04 RX ADMIN — LISINOPRIL AND HYDROCHLOROTHIAZIDE 1 TABLET: 12.5; 1 TABLET ORAL at 07:36

## 2017-05-04 NOTE — DISCHARGE INSTRUCTIONS
DISCHARGE SUMMARY from Nurse    The following personal items are in your possession at time of discharge:    Dental Appliances: None  Visual Aid: None     Home Medications: None  Jewelry: None  Clothing: Footwear, Pants, Undergarments, Shirt, Socks  Other Valuables: Cell Phone, Personal toiletries             PATIENT INSTRUCTIONS:    After general anesthesia or intravenous sedation, for 24 hours or while taking prescription Narcotics:  · Limit your activities  · Do not drive and operate hazardous machinery  · Do not make important personal or business decisions  · Do  not drink alcoholic beverages  · If you have not urinated within 8 hours after discharge, please contact your surgeon on call. Report the following to your surgeon:  · Excessive pain, swelling, redness or odor of or around the surgical area  · Temperature over 100.5  · Nausea and vomiting lasting longer than 4 hours or if unable to take medications  · Any signs of decreased circulation or nerve impairment to extremity: change in color, persistent  numbness, tingling, coldness or increase pain  · Any questions        What to do at Home:  Recommended activity: Activity as tolerated, per MD    If you experience any of the following symptoms fever>101, pain unrelieved with medication, nausea/vomiting, shortness of breath, dizziness/fainting, chest pain. , please follow up with your doctor. *  Please give a list of your current medications to your Primary Care Provider. *  Please update this list whenever your medications are discontinued, doses are      changed, or new medications (including over-the-counter products) are added. *  Please carry medication information at all times in case of emergency situations.           These are general instructions for a healthy lifestyle:    No smoking/ No tobacco products/ Avoid exposure to second hand smoke    Surgeon General's Warning:  Quitting smoking now greatly reduces serious risk to your health. Obesity, smoking, and sedentary lifestyle greatly increases your risk for illness    A healthy diet, regular physical exercise & weight monitoring are important for maintaining a healthy lifestyle    You may be retaining fluid if you have a history of heart failure or if you experience any of the following symptoms:  Weight gain of 3 pounds or more overnight or 5 pounds in a week, increased swelling in our hands or feet or shortness of breath while lying flat in bed. Please call your doctor as soon as you notice any of these symptoms; do not wait until your next office visit. Recognize signs and symptoms of STROKE:    F-face looks uneven    A-arms unable to move or move unevenly    S-speech slurred or non-existent    T-time-call 911 as soon as signs and symptoms begin-DO NOT go       Back to bed or wait to see if you get better-TIME IS BRAIN. Warning Signs of HEART ATTACK     Call 911 if you have these symptoms:   Chest discomfort. Most heart attacks involve discomfort in the center of the chest that lasts more than a few minutes, or that goes away and comes back. It can feel like uncomfortable pressure, squeezing, fullness, or pain.  Discomfort in other areas of the upper body. Symptoms can include pain or discomfort in one or both arms, the back, neck, jaw, or stomach.  Shortness of breath with or without chest discomfort.  Other signs may include breaking out in a cold sweat, nausea, or lightheadedness. Don't wait more than five minutes to call 911 - MINUTES MATTER! Fast action can save your life. Calling 911 is almost always the fastest way to get lifesaving treatment. Emergency Medical Services staff can begin treatment when they arrive -- up to an hour sooner than if someone gets to the hospital by car. The discharge information has been reviewed with the patient. The patient verbalized understanding.     Discharge medications reviewed with the patient and appropriate educational materials and side effects teaching were provided. Laparoscopic Hysterectomy: What to Expect at [facility]    A hysterectomy is surgery to take out the uterus. In some cases, the ovaries and fallopian tubes also are taken out at the same time. You can expect to feel better and stronger each day, but you may need pain medicine for a week or two. You may get tired easily or have less energy than usual. The tiredness may last for several weeks after surgery. You will probably notice that your belly is swollen and puffy. This is common. The swelling will take several weeks to go down. You may take about 4 to 6 weeks to fully recover. It is important to avoid lifting while you are recovering so that you can heal.  This care sheet gives you a general idea about how long it will take for you to recover. But each person recovers at a different pace. Follow the steps below to get better as quickly as possible. How can you care for yourself at home? Activity  · Rest when you feel tired. · Be active. Walking is a good choice. · Allow the area to heal. Don't move quickly or lift anything heavy until you are feeling better. · You may shower 24 to 48 hours after surgery, if your doctor okays it. Pat the incision dry. Do not take a bath for the first 2 weeks, or until your doctor tells you it is okay. · Ask your doctor when it is okay for you to have sex. Diet  · You can eat your normal diet. If your stomach is upset, try bland, low-fat foods like plain rice, broiled chicken, toast, and yogurt. · If your bowel movements are not regular right after surgery, try to avoid constipation and straining. Drink plenty of water. Your doctor may suggest fiber, a stool softener, or a mild laxative. Medicines  · Your doctor will tell you if and when you can restart your medicines. He or she will also give you instructions about taking any new medicines.   · If you take blood thinners, such as warfarin (Coumadin), clopidogrel (Plavix), or aspirin, be sure to talk to your doctor. He or she will tell you if and when to start taking those medicines again. Make sure that you understand exactly what your doctor wants you to do. · Be safe with medicines. Read and follow all instructions on the label. ¨ If the doctor gave you a prescription medicine for pain, take it as prescribed. ¨ If you are not taking a prescription pain medicine, ask your doctor if you can take an over-the-counter medicine. · If your doctor prescribed antibiotics, take them as directed. Do not stop taking them just because you feel better. You need to take the full course of antibiotics. Incision care  · You may have stitches over the cuts (incisions) the doctor made in your belly. · If you have strips of tape on the cut (incision) the doctor made, leave the tape on for a week or until it falls off. · Wash the area daily with warm, soapy water, and pat it dry. Don't use hydrogen peroxide or alcohol. They can slow healing. · You may cover the area with a gauze bandage if it oozes fluid or rubs against clothing. · Change the bandage every day. Other instructions  · You may have some light vaginal bleeding. Wear sanitary pads if needed. Do not douche or use tampons. · Don't have sex until the doctor says it is okay. Follow-up care is a key part of your treatment and safety. Be sure to make and go to all appointments, and call your doctor if you are having problems. It's also a good idea to know your test results and keep a list of the medicines you take. When should you call for help? Call 911 anytime you think you may need emergency care. For example, call if:  · You passed out (lost consciousness). · You have sudden chest pain and shortness of breath, or you cough up blood. Call your doctor now or seek immediate medical care if:  · You have severe vaginal bleeding.  This means that you are soaking through your usual pads every hour for 2 or more hours. · You have sudden, severe pain in your belly or pelvis. · You have loose stitches, or your incision comes open. · You have signs of infection, such as:  ¨ Increased pain, swelling, warmth, or redness. ¨ Red streaks leading from the incision. ¨ Pus draining from the incision. ¨ Swollen lymph nodes in your neck, armpits, or groin. ¨ A fever. Watch closely for changes in your health, and be sure to contact your doctor if:  · You do not get better as expected. Where can you learn more? Go to http://delphine-dhara.info/. Enter Q131 in the search box to learn more about \"Laparoscopic Hysterectomy: What to Expect at Home. \"  Current as of: October 13, 2016  Content Version: 11.2  © 0433-9910 [employer]. Care instructions adapted under license by [employer] (which disclaims liability or warranty for this information). If you have questions about a medical condition or this instruction, always ask your healthcare professional. [place] [address] any warranty or liability for your use of this information.

## 2017-05-04 NOTE — PROGRESS NOTES
Discharge instructions and prescriptions provided and explained to the pt. Med side effect sheet reviewed. Opportunity for questions provided. Pt has to void prior to discharge. Instructed to call once ready to leave.

## 2017-05-04 NOTE — PROGRESS NOTES
OBG/GYN Progress Note    Patient doing well post-op day1 from Procedure(s): HYSTERECTOMY TOTAL LAPAROSCOPIC BILATERAL SALPINGECTOMY without significant complaints. Pain controlled on current medication. Difficulty with voiding and has hoffman. Patient is passing Flatus. Vitals:  Blood pressure 109/69, pulse 88, temperature 98.3 °F (36.8 °C), resp. rate 18, height 5' 11\" (1.803 m), weight 213 lb (96.6 kg), last menstrual period 2017, SpO2 96 %, not currently breastfeeding. Temp (24hrs), Av.8 °F (36.6 °C), Min:96 °F (35.6 °C), Max:98.6 °F (37 °C)        Exam:  Patient without distress. Heart rrr  Lungs cta b&s                Abdomen soft,  nontender. Port sites intact. Incision dry and clean without erythema. Lower extremities are negative for swelling, cords or tenderness. Labs:   Recent Results (from the past 24 hour(s))   HEMOGLOBIN    Collection Time: 17  6:37 AM   Result Value Ref Range    HGB 11.2 (L) 11.7 - 15.4 g/dL       Assessment and Plan:  Patient appears to be having uncomplicated post Procedure(s): HYSTERECTOMY TOTAL LAPAROSCOPIC BILATERAL SALPINGECTOMY course. Continue routine post-op care. Will remove hoffman and attempt voiding. Could go home with in and out cath / leg bag. Pt would like to go home today.

## 2017-05-04 NOTE — PROGRESS NOTES
MD returned page. MD notified of pt's inability to urinate. Orders received. Pt resting in bed. No distress noted. Will continue to monitor.

## 2017-05-04 NOTE — PROGRESS NOTES
Pt unable to void 4 hours after in and out catheterization. Bladder scan approximately 335 mL in bladder. Corey catheter placed per MD orders. Pt tolerated well. Pt states she would like the adhesive sticker & stat lock despite pt's allergy to adhesive. Pt resting in bed. No distress noted. No other needs expressed. Will continue to monitor.

## 2017-05-04 NOTE — PROGRESS NOTES
Pt has not urinated since approximately 1500. Bladder scan shows approximately 500 mL urine in bladder. Pt C/O discomfort in bladder. MD paged. Waiting for MD to return page. No distress noted. Will continue to monitor.

## 2017-05-04 NOTE — PROGRESS NOTES
In and out catheter placed. 600 mL of urine emptied. Pt tolerated well. No distress noted. No other needs expressed. Will continue to monitor.

## 2017-05-04 NOTE — DISCHARGE SUMMARY
Discharge Summary     Name: Annah Denver MRN: [id number]  SSN: [id number]    YOB: 1970  Age: 50 y.o. Sex: female      Admit Date: 5/3/2017    Discharge Date: 5/4/2017      Admitting Physician: Reshma Fernandez DO     * Admission Diagnoses: Menorrhagia    * Discharge Diagnoses:   Hospital Problems as of 5/4/2017  Date Reviewed: 4/27/2017          Codes Class Noted - Resolved POA    * (Principal)Menorrhagia ICD-10-CM: N92.0  ICD-9-CM: 626.2  9/27/2016 - Present Yes               * Procedures: Total Laparoscopic Hysterectomy with Bilateral Salpingectomy    * Discharge Condition: [facility] Hospital Course: Normal hospital course for this procedure. Significant Diagnostic Studies:   Recent Results (from the past 24 hour(s))   HEMOGLOBIN    Collection Time: 05/04/17  6:37 AM   Result Value Ref Range    HGB 11.2 (L) 11.7 - 15.4 g/dL       * Disposition: Home    Discharge Medications:   Current Discharge Medication List      START taking these medications    Details   meperidine (DEMEROL) 50 mg tablet Take 1 Tab by mouth every four (4) hours as needed. Max Daily Amount: 300 mg. Qty: 30 Tab, Refills: 0    Associated Diagnoses: Menorrhagia with regular cycle; Panic disorder; Cervical radiculopathy; Nausea; Encounter for long-term (current) drug use; Arthritis; Malignant melanoma of torso excluding breast ([facility] [address] Preop examination         CONTINUE these medications which have NOT CHANGED    Details   escitalopram oxalate (LEXAPRO) 20 mg tablet Take 1 Tab by mouth daily. Qty: 90 Tab, Refills: 1    Comments: Stop prozac      lisinopril-hydroCHLOROthiazide (PRINZIDE, ZESTORETIC) 10-12.5 mg per tablet Take 1 Tab by mouth two (2) times a day. Indications: hypertension  Qty: 180 Tab, Refills: 3      LORazepam (ATIVAN) 1 mg tablet Take 1-2 Tabs by mouth every six (6) hours as needed for Anxiety. Max Daily Amount: 6 mg.  Indications: ANXIETY  Qty: 150 Tab, Refills: 1      B INFANTIS/B ANI/B CARLA/B BIFID (PROBIOTIC 4X PO) Take  by mouth daily. temazepam (RESTORIL) 15 mg capsule TAKE ONE CAPSULE BY MOUTH AT BEDTIME  Qty: 30 Cap, Refills: 5    Comments: This request is for a new prescription for a controlled substance as required by Federal/State law. meloxicam (MOBIC) 7.5 mg tablet Take  by mouth two (2) times a day. amitriptyline (ELAVIL) 25 mg tablet Take 25 mg by mouth nightly. For back discomfort      VITAMIN B COMP AND VIT C NO.6 (VITAMIN B COMP WITH VIT C NO.6 PO) Take 1 Tab by mouth daily. HYDROcodone-acetaminophen (NORCO)  mg tablet Take 1 Tab by mouth every eight (8) hours as needed. Take / use AM day of surgery  per anesthesia protocols. Indications: Pain  Refills: 0      cyclobenzaprine (FLEXERIL) 10 mg tablet Take 10 mg by mouth three (3) times daily as needed for Muscle Spasm(s). Take / use AM day of surgery  per anesthesia protocols. Indications: MUSCLE SPASM      ascorbic acid (VITAMIN C) 1,000 mg tablet Take 1,000 mg by mouth daily. cyanocobalamin (VITAMIN B-12) 2,500 mcg sublingual tablet Take 2,500 mcg by mouth daily. promethazine (PHENERGAN) 25 mg suppository Insert 1 Suppository into rectum every six (6) hours as needed for Nausea. Qty: 6 Suppository, Refills: 1    Associated Diagnoses: Nausea              * Follow-up Care/Patient Instructions: Activity: No sex, douching, or tampons for 6 weeks or as directed by your physician. No heavy lifting for 6 weeks. No driving while taking pain medication.   Diet: Resume pre-hospital diet  Wound Care: As directed    Follow-up Information     Follow up With Details Comments [address]  [phone]             Signed By:  Samy Burgess DO     May 4, 2017

## 2017-05-04 NOTE — PROGRESS NOTES
Pt C/O pain 7/10 in abdomen. Demerol 50 mg PO administered, see MAR. Pt resting in bed. No distress noted. Will continue to monitor.

## 2017-05-04 NOTE — PROGRESS NOTES
Shift assessment complete via doc flowsheet. Pt A&O x 4. HR regular, S1S2. Respirations even and unlabored. Lung sounds CTA bilaterally. Abdomen soft and tender. Bowel sounds active in all quadrants. Pt refusing SCDs. Pt encouraged to ambulate. Pt verbalizes understanding. Pt encouraged to use Incentive Spirometer. Pt verbalizes understanding. 3 PS with Dermabond C/D/I. Pt resting in bed. Bed in low and locked position, side rails up x 3. Call light within reach. Pt instructed to call for assistance. Pt verbalizes understanding. No other needs expressed. No distress noted. Will continue to monitor.

## 2017-05-04 NOTE — PROGRESS NOTES
Discharge orders written. Pt has had difficulty voiding. RN just removed hoffman and pt has to void before leaving.

## 2017-05-04 NOTE — PROGRESS NOTES
Pt C/O pain 8/10. Demerol 50 mg PO administered, see MAR. Pt resting in bed. No distress noted. Will continue to monitor.

## 2017-05-04 NOTE — PROGRESS NOTES
Demerol 50 mg given po for 7/10 pain to abd and back. Safety measures in place. Continue to monitor.

## 2017-06-20 PROBLEM — Z01.818 PREOP EXAMINATION: Status: RESOLVED | Noted: 2017-04-27 | Resolved: 2017-06-20

## 2017-08-23 PROBLEM — J30.89 NON-SEASONAL ALLERGIC RHINITIS: Status: ACTIVE | Noted: 2017-08-23

## 2017-09-19 PROBLEM — F41.9 ANXIETY AND DEPRESSION: Status: ACTIVE | Noted: 2017-09-19

## 2017-09-19 PROBLEM — F32.A ANXIETY AND DEPRESSION: Status: ACTIVE | Noted: 2017-09-19

## 2018-08-17 ENCOUNTER — HOSPITAL ENCOUNTER (OUTPATIENT)
Dept: LAB | Age: 48
Discharge: HOME | End: 2018-08-17

## 2018-08-17 PROCEDURE — 88305 TISSUE EXAM BY PATHOLOGIST: CPT

## 2018-08-17 PROCEDURE — 88312 SPECIAL STAINS GROUP 1: CPT

## 2018-12-18 ENCOUNTER — HOSPITAL ENCOUNTER (OUTPATIENT)
Dept: LAB | Age: 48
Discharge: HOME | End: 2018-12-18

## 2018-12-18 PROCEDURE — 88305 TISSUE EXAM BY PATHOLOGIST: CPT

## 2020-10-08 PROBLEM — R92.2 BREAST DENSITY: Status: ACTIVE | Noted: 2020-10-08

## 2020-10-16 ENCOUNTER — HOSPITAL ENCOUNTER (OUTPATIENT)
Dept: MAMMOGRAPHY | Age: 50
Discharge: HOME | End: 2020-10-16
Attending: OBSTETRICS & GYNECOLOGY
Payer: COMMERCIAL

## 2020-10-16 DIAGNOSIS — R92.2 BREAST DENSITY: ICD-10-CM

## 2020-10-16 PROCEDURE — 76642 ULTRASOUND BREAST LIMITED: CPT

## 2020-10-16 PROCEDURE — 77065 DX MAMMO INCL CAD UNI: CPT

## 2020-10-19 NOTE — PROGRESS NOTES
Neg mammogram but dense tissue so could consider a breast mri which is more sensitive at looking at dense tissue.

## (undated) DEVICE — TRAY CATH 16F DRN BG LTX -- CONVERT TO ITEM 363158

## (undated) DEVICE — [HIGH FLOW INSUFFLATOR,  DO NOT USE IF PACKAGE IS DAMAGED,  KEEP DRY,  KEEP AWAY FROM SUNLIGHT,  PROTECT FROM HEAT AND RADIOACTIVE SOURCES.]: Brand: PNEUMOSURE

## (undated) DEVICE — BLADELESS OPTICAL TROCAR WITH FIXATION CANNULA: Brand: VERSAPORT

## (undated) DEVICE — SUTURE VCRL SZ 0 L36IN ABSRB UD L36MM CT-1 1/2 CIR J946H

## (undated) DEVICE — SOLUTION IRRIG 3000ML 0.9% SOD CHL FLX CONT 0797208] ICU MEDICAL INC]

## (undated) DEVICE — Device

## (undated) DEVICE — SOL ANTI-FOG 6ML MEDC -- MEDICHOICE - CONVERT TO 358427

## (undated) DEVICE — SINGLE BASIN: Brand: CARDINAL HEALTH

## (undated) DEVICE — UNIVERSAL FIXATION CANNULA: Brand: VERSAONE

## (undated) DEVICE — VCARE MEDIUM, UTERINE MANIPULATOR, VAGINAL-CERVICAL-AHLUWALIA'S-RETRACTOR-ELEVATOR: Brand: VCARE

## (undated) DEVICE — SHEARS ENDOSCP L36CM DIA5MM ULTRASONIC CRV TIP W/ ADV

## (undated) DEVICE — PERI-PAD,MODERATE: Brand: CURITY

## (undated) DEVICE — SYR BULB 60ML IRRIGATION -- CONVERT TO ITEM 116413

## (undated) DEVICE — LOGICUT SCISSOR LENGTH 320MM: Brand: LOGI - LAPAROSCOPIC INSTRUMENT SYSTEM

## (undated) DEVICE — DISSECTOR ENDOSCP TIP DIA10MM BLNT ENDOPATH

## (undated) DEVICE — TRAY PREP DRY W/ PREM GLV 2 APPL 6 SPNG 2 UNDPD 1 OVERWRAP

## (undated) DEVICE — 2, DISPOSABLE SUCTION/IRRIGATOR WITHOUT DISPOSABLE TIP: Brand: STRYKEFLOW

## (undated) DEVICE — 2000CC GUARDIAN II: Brand: GUARDIAN

## (undated) DEVICE — DRAPE TWL SURG 16X26IN BLU ORB04] ALLCARE INC]

## (undated) DEVICE — CARDINAL HEALTH FLEXIBLE LIGHT HANDLE COVER: Brand: CARDINAL HEALTH

## (undated) DEVICE — SUTURE VCRL SZ 4-0 L18IN ABSRB UD L19MM PS-2 3/8 CIR PRIM J496H

## (undated) DEVICE — INSUFFLATION NEEDLE: Brand: SURGINEEDLE

## (undated) DEVICE — KENDALL SCD EXPRESS SLEEVES, KNEE LENGTH, MEDIUM: Brand: KENDALL SCD

## (undated) DEVICE — REM POLYHESIVE ADULT PATIENT RETURN ELECTRODE: Brand: VALLEYLAB

## (undated) DEVICE — SOLUTION IV 1000ML 0.9% SOD CHL

## (undated) DEVICE — FILTER SMK EVAC FLO CLR MEGADYNE

## (undated) DEVICE — (D)PREP SKN CHLRAPRP APPL 26ML -- CONVERT TO ITEM 371833